# Patient Record
Sex: MALE | Race: BLACK OR AFRICAN AMERICAN | Employment: STUDENT | URBAN - METROPOLITAN AREA
[De-identification: names, ages, dates, MRNs, and addresses within clinical notes are randomized per-mention and may not be internally consistent; named-entity substitution may affect disease eponyms.]

---

## 2017-05-26 ENCOUNTER — HOSPITAL ENCOUNTER (OUTPATIENT)
Dept: PHYSICAL THERAPY | Age: 22
Discharge: HOME | End: 2017-05-26
Payer: COMMERCIAL

## 2017-05-26 PROCEDURE — 97535 SELF CARE MNGMENT TRAINING: CPT

## 2017-05-26 PROCEDURE — 97162 PT EVAL MOD COMPLEX 30 MIN: CPT

## 2017-05-26 PROCEDURE — 97140 MANUAL THERAPY 1/> REGIONS: CPT

## 2017-05-26 NOTE — PROGRESS NOTES
[facility] PHYSICAL THERAPY  [address] [place] [address] 201,[facility], 70 [facility] - Phone: [phone]  Fax: [phone] OF CARE / [address]  Patient Name: Lillie Knowles : 1995   Medical   Diagnosis: Right knee pain [M25.561] Treatment Diagnosis: Right knee pain [M25.561]   Onset Date: 16     Referral Source: Frances Mclean Start of Care [facility]): 2017   Prior Hospitalization: See medical history Provider #: [id number]   Prior Level of Function: Pain free with all activities   Comorbidities: Asthma   Medications: Verified on Patient Summary List   The Plan of Care and following information is based on the information from the initial evaluation.   ===========================================================================================  Assessment / amrte information:  Lillie Knowles is a 22 y.o.  yo male with Dx of Right knee pain [M25.561]. He reports having R ACL reconstruction on 16. He currently rates his pain as 9/10 at worst, 1/10 at best, primarily located at anterior aspect of his R knee. Objective Findings:  Gait: lack of terminal ext noted on R, Knee AROM: R = 7-127 deg, L = 0-125 deg. Manual Muscle Testing:  Quad Set: R = Poor, L = WNL. All other LE strength are WNL. Palpation:  Increased soft tissue tension noted at R biceps femoris and proximal gastrocnemius. Pt instructed in HEP and will f/u in clinic for PT.  ===========================================================================================  Eval Complexity: History MEDIUM  Complexity : 1-2 comorbidities / personal factors will impact the outcome/ POC ;  Examination  MEDIUM Complexity : 3 Standardized tests and measures addressing body structure, function, activity limitation and / or participation in recreation ; Presentation MEDIUM Complexity : Evolving with changing characteristics ;   Decision Making MEDIUM Complexity : FOTO score of 26-74; Overall Complexity MEDIUM  Problem List: pain affecting function, decrease ROM, decrease strength, decrease ADL/ functional abilitiies, decrease activity tolerance and decrease flexibility/ joint mobility   Treatment Plan may include any combination of the following: Therapeutic exercise, Therapeutic activities, Neuromuscular re-education, Physical agent/modality, Manual therapy, Patient education, Self Care training, Functional mobility training and Home safety training  Patient / Family readiness to learn indicated by: asking questions, trying to perform skills and interest  Persons(s) to be included in education: patient (P)  Barriers to Learning/Limitations: no  Measures taken: FOTO = 58%   Patient Goal (s): Decrease pain    Patient self reported health status: good  Rehabilitation Potential: good   Short Term Goals: To be accomplished in  1-2  weeks:  1. Independent with HEP. 2. Decrease max pain 25-50% to assist with return to sporting activities    Long Term Goals: To be accomplished in  3-4  weeks:  1. Decrease max pain 50-75% to assist with return to sporting activities  2. Increase FOTO score to 74% to show functional improvment. 3.  Will rate  >/= +5 on Global Rating of Change and be prepared to DC to HEP. Frequency / Duration:   Patient to be seen  2-3  times per week for 3-4  weeks:  Patient / Caregiver education and instruction: self care and exercises    Therapist Signature: Dayana English DPT, OCS, SCS, CSCS Date: 6/60/9765   Certification Period: na Time: 2:39 PM   ===========================================================================================  I certify that the above Physical Therapy Services are being furnished while the patient is under my care. I agree with the treatment plan and certify that this therapy is necessary.     Physician Signature:        Date:       Time:     Please sign and return to [facility] or you may fax the signed copy to [phone]. Thank you.

## 2017-05-26 NOTE — PROGRESS NOTES
PHYSICAL THERAPY - DAILY TREATMENT NOTE    Patient Name: Darin Jennings        Date: 2017  : 1995   YES Patient  Verified  Visit #:      of   12  Insurance: Payor: /      In time: 2:20 Out time: 4:00   Total Treatment Time: 40     Medicare Time Tracking (below)   Total Timed Codes (min):  na 1:1 Treatment Time:  na     TREATMENT AREA =  Right knee pain [M25.561]    SUBJECTIVE  Pain Level (on 0 to 10 scale):    Medication Changes/New allergies or changes in medical history, any new surgeries or procedures? NO    If yes, update Summary List   Subjective Functional Status/Changes:  []  No changes reported     SEE IE          OBJECTIVE      10 min Manual Therapy: DTM R BF, Med Prox Gastroc, Popliteus   Rationale:      decrease pain, increase ROM and increase tissue extensibility to improve patient's ability to amb     min Patient Education:  YES  Reviewed HEP   []  Progressed/Changed HEP based on: Other Objective/Functional Measures:    SEE IE     Post Treatment Pain Level (on 0 to 10) scale:       ASSESSMENT  Assessment/Changes in Function:     SEE IE     []  See Progress Note/Recertification   Patient will continue to benefit from skilled PT services to modify and progress therapeutic interventions, address functional mobility deficits, address ROM deficits, address strength deficits, analyze and address soft tissue restrictions, analyze and cue movement patterns, analyze and modify body mechanics/ergonomics and assess and modify postural abnormalities to attain remaining goals.    Progress toward goals / Updated goals:         PLAN  []  Upgrade activities as tolerated YES Continue plan of care   []  Discharge due to :    []  Other:      Therapist: Joselin Cyr, PT, OCS, SCS, CSCS    Date: 2017 Time: 1:57 PM       Future Appointments  Date Time Provider Brisa Saxena   2017 2:00 PM [facility]

## 2017-05-30 ENCOUNTER — HOSPITAL ENCOUNTER (OUTPATIENT)
Dept: PHYSICAL THERAPY | Age: 22
Discharge: HOME | End: 2017-05-30
Payer: COMMERCIAL

## 2017-05-30 PROCEDURE — 97140 MANUAL THERAPY 1/> REGIONS: CPT

## 2017-05-30 PROCEDURE — 97110 THERAPEUTIC EXERCISES: CPT

## 2017-05-30 NOTE — PROGRESS NOTES
PHYSICAL THERAPY - DAILY TREATMENT NOTE    Patient Name: Jonelle Anaya        Date: 2017  : 1995   YES Patient  Verified  Visit #:   2   of   12  Insurance: Payor: Jerica Milks / Plan: Greg Fruits PPO / Product Type: PPO /      In time: 233 Out time: 355   Total Treatment Time: 82     Medicare Time Tracking (below)   Total Timed Codes (min):  na 1:1 Treatment Time:  na     TREATMENT AREA =  Right knee pain [M25.561]    SUBJECTIVE  Pain Level (on 0 to 10 scale):  2  / 10   Medication Changes/New allergies or changes in medical history, any new surgeries or procedures? NO    If yes, update Summary List   Subjective Functional Status/Changes:  []  No changes reported     Patient reports the exercises he received at his IE seem to be helping. Patient states he continues to work on improving his knee extension. OBJECTIVE  Modalities Rationale:     decrease inflammation and decrease pain to improve patient's ability to perform transfers. min [] Estim, type/location:                                      []  att     []  unatt     []  w/US     []  w/ice    []  w/heat    min []  Mechanical Traction: type/lbs                   []  pro   []  sup   []  int   []  cont    []  before manual    []  after manual    min []  Ultrasound, settings/location:      min []  Iontophoresis w/ dexamethasone, location:                                               []  take home patch       []  in clinic   10 min [x]  Ice     []  Heat    location/position: To R knee in supine with bolster under ankle    min []  Vasopneumatic Device, press/temp:     min []  Other:    [x] Skin assessment post-treatment (if applicable):    [x]  intact    []  redness- no adverse reaction     []redness  adverse reaction:        57 min Therapeutic Exercise:  [x]  See flow sheet   Rationale:      increase ROM, increase strength, improve coordination and improve balance to improve the patients ability to perform athletic activities. 15 min Manual Therapy: A. R.T to R biceps femoris, R semitendinosis, semimembrinosis; STM to proximal medial and lateral gastroc heads; R HS stretch   Rationale:      decrease pain, increase ROM, increase tissue extensibility and decrease trigger points to improve patient's ability to perform walking activities. min Patient Education:  YES  Reviewed HEP   []  Progressed/Changed HEP based on: Other Objective/Functional Measures:    Patient ambulated with flexed knee gait pattern and poor heel strike throughout session. Progressed program for improved  R knee ROM, flexibility, strength and stability  Tenderness reported to R biceps femoris during MT     Post Treatment Pain Level (on 0 to 10) scale:   1  / 10     ASSESSMENT  Assessment/Changes in Function:     Patient reported improved symptoms post session. Educated on and provided copy of prone hang exercise to perform at home in order to improve R knee extension ROM. []  See Progress Note/Recertification   Patient will continue to benefit from skilled PT services to modify and progress therapeutic interventions, address functional mobility deficits, address ROM deficits, address strength deficits, analyze and address soft tissue restrictions, analyze and cue movement patterns, analyze and modify body mechanics/ergonomics, assess and modify postural abnormalities and address imbalance/dizziness to attain remaining goals.    Progress toward goals / Updated goals:    Progressing well with STG#1     PLAN  [x]  Upgrade activities as tolerated YES Continue plan of care   []  Discharge due to :    []  Other:      Therapist: Trinda Klinefelter, PT    Date: 5/30/2017 Time: 4:47 PM     Future Appointments  Date Time Provider Brisa Saxena   6/1/2017 2:30 PM Trinda Klinefelter, PT [facility]

## 2017-06-01 ENCOUNTER — HOSPITAL ENCOUNTER (OUTPATIENT)
Dept: PHYSICAL THERAPY | Age: 22
Discharge: HOME | End: 2017-06-01
Payer: COMMERCIAL

## 2017-06-01 PROCEDURE — 97140 MANUAL THERAPY 1/> REGIONS: CPT

## 2017-06-01 PROCEDURE — 97110 THERAPEUTIC EXERCISES: CPT

## 2017-06-01 NOTE — PROGRESS NOTES
PHYSICAL THERAPY - DAILY TREATMENT NOTE    Patient Name: Elvis Concepcion        Date: 2017  : 1995   YES Patient  Verified  Visit #:   3   of   12  Insurance: Payor: Tip Stratton / Plan: Matilde Arciniega PPO / Product Type: PPO /      In time: 235 Out time: 330   Total Treatment Time: 55     Medicare Time Tracking (below)   Total Timed Codes (min):  na 1:1 Treatment Time:  na     TREATMENT AREA =  Right knee pain [M25.561]    SUBJECTIVE  Pain Level (on 0 to 10 scale):  2  / 10   Medication Changes/New allergies or changes in medical history, any new surgeries or procedures? NO    If yes, update Summary List   Subjective Functional Status/Changes:  []  No changes reported     Patient reports his knee felt good after his LV. Patient denies complications since his LV. OBJECTIVE  Modalities Rationale:     decrease inflammation and decrease pain to improve patient's ability to perform transfers. min [] Estim, type/location:                                      []  att     []  unatt     []  w/US     []  w/ice    []  w/heat    min []  Mechanical Traction: type/lbs                   []  pro   []  sup   []  int   []  cont    []  before manual    []  after manual    min []  Ultrasound, settings/location:      min []  Iontophoresis w/ dexamethasone, location:                                               []  take home patch       []  in clinic   10 min [x]  Ice     []  Heat    location/position: To R knee in supine    min []  Vasopneumatic Device, press/temp:     min []  Other:    [x] Skin assessment post-treatment (if applicable):    [x]  intact    []  redness- no adverse reaction     []redness  adverse reaction:         35 min Therapeutic Exercise:  [x]  See flow sheet   Rationale:      increase ROM, increase strength, improve coordination and improve balance to improve the patients ability to perform athletic activities. 10 min Manual Therapy: A. R.T to R Biceps femoris, R proximal gastroc; R posterior femur mobs, R HS stretch   Rationale:      decrease pain, increase ROM, increase tissue extensibility and decrease trigger points to improve patient's ability to perform walking activities. min Patient Education:  YES  Reviewed HEP   []  Progressed/Changed HEP based on: Other Objective/Functional Measures:    Patient able to achieve passive knee extension to near neutral position, however continues to ambulate on a flexed knee with poor terminal knee extension. Patient able to complete program with increased fatigue, however no increase in pain. Post Treatment Pain Level (on 0 to 10) scale:   2  / 10     ASSESSMENT  Assessment/Changes in Function:     Patient denied changes in symptoms post session. Will continue to benefit from progression of program in order to establish full ROM and promote return to athletic activities. []  See Progress Note/Recertification   Patient will continue to benefit from skilled PT services to modify and progress therapeutic interventions, address functional mobility deficits, address ROM deficits, address strength deficits, analyze and address soft tissue restrictions, analyze and cue movement patterns, analyze and modify body mechanics/ergonomics and assess and modify postural abnormalities to attain remaining goals.    Progress toward goals / Updated goals:    Met STG#1     PLAN  [x]  Upgrade activities as tolerated YES Continue plan of care   []  Discharge due to :    []  Other:      Therapist: Triston Spann PT    Date: 6/1/2017 Time: 5:06 PM     Future Appointments  Date Time Provider Brisa Saxena   6/6/2017 2:30 PM Triston Spann PT [facility]   6/13/2017 2:00 PM Dougie Jarvis, PT [facility]   6/15/2017 8:00 AM Dougie Jarvis PT [facility]

## 2017-06-06 ENCOUNTER — HOSPITAL ENCOUNTER (OUTPATIENT)
Dept: PHYSICAL THERAPY | Age: 22
Discharge: HOME | End: 2017-06-06
Payer: COMMERCIAL

## 2017-06-06 PROCEDURE — 97140 MANUAL THERAPY 1/> REGIONS: CPT

## 2017-06-06 PROCEDURE — 97110 THERAPEUTIC EXERCISES: CPT

## 2017-06-06 NOTE — PROGRESS NOTES
PHYSICAL THERAPY - DAILY TREATMENT NOTE    Patient Name: Jonelle Anaya        Date: 2017  : 1995   YES Patient  Verified  Visit #:      of   12  Insurance: Payor: Jerica Milks / Plan: Greg Fruits PPO / Product Type: PPO /      In time: 230 Out time: 338   Total Treatment Time: 68     Medicare Time Tracking (below)   Total Timed Codes (min):  na 1:1 Treatment Time:  na     TREATMENT AREA =  Right knee pain [M25.561]    SUBJECTIVE  Pain Level (on 0 to 10 scale):  1  / 10   Medication Changes/New allergies or changes in medical history, any new surgeries or procedures? NO    If yes, update Summary List   Subjective Functional Status/Changes:  []  No changes reported     Patient states his knee doesn't hurt, it just feels stiff. Patient reports he continues to perform his exercise at home which seems to be helping. OBJECTIVE    53 min Therapeutic Exercise:  [x]  See flow sheet   Rationale:      increase ROM, increase strength, improve coordination, improve balance and increase proprioception to improve the patients ability to perform athletic activities. 15 min Manual Therapy: A. R.T to R medial and lateral HS, STM to proximal gastrocs; R HS stretcg   Rationale:      decrease pain, increase ROM, increase tissue extensibility and decrease trigger points to improve patient's ability to perform walking activities. min Patient Education:  YES  Reviewed HEP   []  Progressed/Changed HEP based on: Other Objective/Functional Measures:    Progressed several TE today for improved R LE strength, stability and proprioception  Patient cued during SL squat to table on proper weight shift and alignment of R LE-patient reporting increased difficulty and fatigue after performing correctly. Post Treatment Pain Level (on 0 to 10) scale:   0  / 10     ASSESSMENT  Assessment/Changes in Function:     Patient tolerated progression of her program well today.  Patient educated to perform SL squat to high chair or couch at home with focus on proper knee alignment and stability. []  See Progress Note/Recertification   Patient will continue to benefit from skilled PT services to modify and progress therapeutic interventions, address functional mobility deficits, address ROM deficits, address strength deficits, analyze and address soft tissue restrictions, analyze and cue movement patterns, analyze and modify body mechanics/ergonomics, assess and modify postural abnormalities and address imbalance/dizziness to attain remaining goals.    Progress toward goals / Updated goals:    Progressing well with LTG#1     PLAN  [x]  Upgrade activities as tolerated YES Continue plan of care   []  Discharge due to :    []  Other:      Therapist: Toma Key PT    Date: 6/6/2017 Time: 5:58 PM     Future Appointments  Date Time Provider Brisa Saxena   6/13/2017 2:00 PM Adolfo Canales PT [facility]   6/15/2017 8:00 AM Adolfo Canales, PT [facility]

## 2017-06-13 ENCOUNTER — HOSPITAL ENCOUNTER (OUTPATIENT)
Dept: PHYSICAL THERAPY | Age: 22
Discharge: HOME | End: 2017-06-13
Payer: COMMERCIAL

## 2017-06-13 PROCEDURE — 97140 MANUAL THERAPY 1/> REGIONS: CPT

## 2017-06-13 PROCEDURE — 97110 THERAPEUTIC EXERCISES: CPT

## 2017-06-13 NOTE — PROGRESS NOTES
PHYSICAL THERAPY - DAILY TREATMENT NOTE    Patient Name: Ferny Amin        Date: 2017  : 1995   YES Patient  Verified  Visit #:      of   12  Insurance: Payor: Jerald Silverman / Plan: Vern Ross PPO / Product Type: PPO /      In time: 2:00 Out time: 3:00   Total Treatment Time: 60     Medicare Time Tracking (below)   Total Timed Codes (min):  na 1:1 Treatment Time:  na     TREATMENT AREA =  Right knee pain [M25.561]    SUBJECTIVE  Pain Level (on 0 to 10 scale):  3- 10   Medication Changes/New allergies or changes in medical history, any new surgeries or procedures? NO    If yes, update Summary List   Subjective Functional Status/Changes:  []  No changes reported     Knee is getting straighter, but still not all the way          OBJECTIVE      45 min Therapeutic Exercise:  [x]  See flow sheet   Rationale:      increase ROM, increase strength and improve coordination to improve the patients ability to return to sport     15 min Manual Therapy: DTM R BF, Med Prox Gastroc, Popliteus, HS stretch   Rationale: decrease pain, increase ROM and increase tissue extensibility to improve patient's ability to return to sport     min Patient Education:  YES  Reviewed HEP   []  Progressed/Changed HEP based on: Other Objective/Functional Measures:    Cont to lack end range ext and demonstrates instability with step downs.       Post Treatment Pain Level (on 0 to 10) scale:   1  / 10     ASSESSMENT  Assessment/Changes in Function:     Good marimar to all Rx without increase in pain      []  See Progress Note/Recertification   Patient will continue to benefit from skilled PT services to modify and progress therapeutic interventions, address functional mobility deficits, address ROM deficits, address strength deficits, analyze and address soft tissue restrictions, analyze and cue movement patterns, analyze and modify body mechanics/ergonomics and assess and modify postural abnormalities to attain remaining goals.   Progress toward goals / Updated goals:    Slow progress with ROM      PLAN  []  Upgrade activities as tolerated YES Continue plan of care   []  Discharge due to :    []  Other:      Therapist: Graham De La Cruz, PT, OCS, SCS, CSCS    Date: 6/13/2017 Time: 10:04 AM       Future Appointments  Date Time Provider Brisa Saxena   6/13/2017 2:00 PM Abdi Tucker PT [facility]   6/15/2017 8:00 AM Abdi Tucker PT [address] [place] [address]

## 2017-06-16 ENCOUNTER — HOSPITAL ENCOUNTER (OUTPATIENT)
Dept: PHYSICAL THERAPY | Age: 22
Discharge: HOME | End: 2017-06-16
Payer: COMMERCIAL

## 2017-06-16 PROCEDURE — 97110 THERAPEUTIC EXERCISES: CPT

## 2017-06-16 PROCEDURE — 97140 MANUAL THERAPY 1/> REGIONS: CPT

## 2017-06-16 NOTE — PROGRESS NOTES
PHYSICAL THERAPY - DAILY TREATMENT NOTE    Patient Name: Sharon Dykes        Date: 2017  : 1995   YES Patient  Verified  Visit #:     Insurance: Payor: [id number] PIYUSH Turner / Plan: Kailey Anglin PPO / Product Type: PPO /      In time: 12:55 Out time: 1:55   Total Treatment Time: 60     Medicare Time Tracking (below)   Total Timed Codes (min):  na 1:1 Treatment Time:  na     TREATMENT AREA =  Right knee pain [M25.561]    SUBJECTIVE  Pain Level (on 0 to 10 scale):  2-3   10   Medication Changes/New allergies or changes in medical history, any new surgeries or procedures? NO    If yes, update Summary List   Subjective Functional Status/Changes:  []  No changes reported     Im sore because I lifted weights, but it was good          OBJECTIVE    45 min Therapeutic Exercise: [x] See flow sheet   Rationale: increase ROM, increase strength and improve coordination to improve the patients ability to return to sport      15 min Manual Therapy: DTM R BF, Med Prox Gastroc, Popliteus, HS stretch   Rationale: decrease pain, increase ROM and increase tissue extensibility to improve patient's ability to return to sport     min Patient Education:  YES  Reviewed HEP   []  Progressed/Changed HEP based on: Other Objective/Functional Measures:    Cont to have difficulty with side planks  FOTO = 68  GROC = +5   Post Treatment Pain Level (on 0 to 10) scale:    10     ASSESSMENT  Assessment/Changes in Function:     Good marimar to all Rx without increase in pain      []  See Progress Note/Recertification   Patient will continue to benefit from skilled PT services to modify and progress therapeutic interventions, address functional mobility deficits, address ROM deficits, address strength deficits, analyze and address soft tissue restrictions, analyze and cue movement patterns, analyze and modify body mechanics/ergonomics and assess and modify postural abnormalities to attain remaining goals.    Progress toward goals / Updated goals:    Slow progress with ROM      PLAN  []  Upgrade activities as tolerated YES Continue plan of care   []  Discharge due to :    []  Other:      Therapist: Delmy Jarvis, PT, OCS, SCS, CSCS    Date: 6/16/2017 Time: 12:48 PM       Future Appointments  Date Time Provider Brisa Saxena   6/16/2017 1:00 PM Jayden Drake PT [facility]   6/20/2017 10:30 AM Jayden Drake PT [facility]   6/23/2017 2:30 PM Jayden Drake PT [facility]   6/27/2017 3:00 PM Precilla Severin, [facility]

## 2017-06-20 ENCOUNTER — HOSPITAL ENCOUNTER (OUTPATIENT)
Dept: PHYSICAL THERAPY | Age: 22
Discharge: HOME | End: 2017-06-20
Payer: COMMERCIAL

## 2017-06-20 PROCEDURE — 97140 MANUAL THERAPY 1/> REGIONS: CPT

## 2017-06-20 PROCEDURE — 97110 THERAPEUTIC EXERCISES: CPT

## 2017-06-20 NOTE — PROGRESS NOTES
PHYSICAL THERAPY - DAILY TREATMENT NOTE    Patient Name: Peggy Koenig        Date: 2017  : 1995   YES Patient  Verified  Visit #:     Insurance: Payor: Marcelina Dodson / Plan: Christie Pinzon PPO / Product Type: PPO /      In time: 10:30 Out time: 11:20   Total Treatment Time: 50     Medicare Time Tracking (below)   Total Timed Codes (min):  na 1:1 Treatment Time:  na     TREATMENT AREA =  Right knee pain [M25.561]    SUBJECTIVE  Pain Level (on 0 to 10 scale):  1  / 10   Medication Changes/New allergies or changes in medical history, any new surgeries or procedures? NO    If yes, update Summary List   Subjective Functional Status/Changes:  []  No changes reported     No new c/o          OBJECTIVE    40 min Therapeutic Exercise: [x] See flow sheet   Rationale: increase ROM, increase strength and improve coordination to improve the patients ability to return to sport       10 min Manual Therapy: DTM R BF, Med Prox Gastroc, Popliteus, HS stretch   Rationale: decrease pain, increase ROM and increase tissue extensibility to improve patient's ability to return to sport     min Patient Education:  YES  Reviewed HEP   []  Progressed/Changed HEP based on: Other Objective/Functional Measures:    Cont to lack end range ext     Post Treatment Pain Level (on 0 to 10) scale:   0  / 10     ASSESSMENT  Assessment/Changes in Function:     Good marimar to al Rx without increase in pain      []  See Progress Note/Recertification   Patient will continue to benefit from skilled PT services to modify and progress therapeutic interventions, address functional mobility deficits, address ROM deficits, address strength deficits, analyze and address soft tissue restrictions, analyze and cue movement patterns, analyze and modify body mechanics/ergonomics and assess and modify postural abnormalities to attain remaining goals.    Progress toward goals / Updated goals:    Slow progress with ROM      PLAN  []  Upgrade activities as tolerated YES Continue plan of care   []  Discharge due to :    []  Other:      Therapist: Angelo Flores, PT, OCS, SCS, CSCS    Date: 6/20/2017 Time: 9:55 AM       Future Appointments  Date Time Provider Brisa Saxena   6/20/2017 10:30 AM Cuong Arguelles, IRVIN [facility]   6/23/2017 2:30 PM Cuong Arguelles PT [facility]   6/27/2017 3:00 PM Evans Reilly PT [facility]

## 2017-06-23 ENCOUNTER — HOSPITAL ENCOUNTER (OUTPATIENT)
Dept: PHYSICAL THERAPY | Age: 22
Discharge: HOME | End: 2017-06-23
Payer: COMMERCIAL

## 2017-06-23 PROCEDURE — 97140 MANUAL THERAPY 1/> REGIONS: CPT

## 2017-06-23 PROCEDURE — 97110 THERAPEUTIC EXERCISES: CPT

## 2017-06-23 NOTE — PROGRESS NOTES
[address] PHYSICAL THERAPY   [facility] [address], [address],[facility], [facility] - Phone: [phone]  Fax: [phone]  PROGRESS NOTE  Patient Name: Toni Sykes : 1995   Treatment/Medical Diagnosis: Right knee pain [M25.561]   Referral Source: Miladis Bishop, *     Date of Initial Visit: 17 Attended Visits: 8 Missed Visits: 0     SUMMARY OF TREATMENT  Toni Sykes has been seen at our clinic 2-3x/wk for a total of 8 visits. Pt treatment has consisted of  therapeutic exercise for knee ROM, LE strengthening, hip/core strengthening, and manual therapy (patellar mobilization and deep tissue mobilization at hamstring and gastroc)  CURRENT STATUS  Pt has had a good tolerance to physical therapy treatment. His knee ROM has improved to 7 - 125 (was 7-127 at IE). He also demonstrates improved stability and is now able to perform sing leg step down exercise on 6 inch box with minimal instability. However, he continues to demonstrate lack of lateral stability with therapeutic exercises and has not been able to progress to single leg plyometric type of exercises. Goal/Measure of Progress Goal Met? 1. Decrease Max pain by 50-75% to assist with return to sport   Status at last Eval: 9/10 Current Status: 3/10 yes   2. Increase FOTO score to 74 % show functional improvement   Status at last Eval: 58% Current Status: 68% progressing   3. Will rate >/= +5 on Global Rating of Change and be prepared to DC to HEP. Status at last Eval: na Current Status: +5 yes     New Goals to be achieved in __4__  weeks:  1. Pt will begin SL plyometric exercise in preparation for return to sport   2.     3.     RECOMMENDATIONS    Specifics: 2-3x/wk x 4 more wks  If you have any questions/comments please contact us directly at [phone]. Thank you for allowing us to assist in the care of your patient.     Therapist Signature: Dayana English DPT, OCS, [place], CSCS Date: 6/23/2017     Time: 4:18 PM   NOTE TO PHYSICIAN:  PLEASE COMPLETE THE ORDERS BELOW AND FAX TO   [facility]: [phone]  If you are unable to process this request in 24 hours please contact our office: [phone]    ___ I have read the above report and request that my patient continue as recommended.   ___ I have read the above report and request that my patient continue therapy with the following changes/special instructions:_________________________________________________________   ___ I have read the above report and request that my patient be discharged from therapy.      Physician Signature:        Date:       Time:

## 2017-06-23 NOTE — PROGRESS NOTES
PHYSICAL THERAPY - DAILY TREATMENT NOTE    Patient Name: Heather Tinoco        Date: 2017  : 1995   YES Patient  Verified  Visit #:   8      12  Insurance: Payor: Cassi Martínez / Plan: Adonis De Paz PPO / Product Type: PPO /      In time: 2:20 Out time: 3:30   Total Treatment Time: 70     Medicare Time Tracking (below)   Total Timed Codes (min):  na 1:1 Treatment Time:  na     TREATMENT AREA =  Right knee pain [M25.561]    SUBJECTIVE  Pain Level (on 0 to 10 scale):  3  / 10   Medication Changes/New allergies or changes in medical history, any new surgeries or procedures? NO    If yes, update Summary List   Subjective Functional Status/Changes:  []  No changes reported     Just a little sore. OBJECTIVE    60 min Therapeutic Exercise: [x] See flow sheet   Rationale: increase ROM, increase strength and improve coordination to improve the patients ability to return to sport       10 min Manual Therapy: DTM R BF, Med Prox Gastroc, Popliteus, HS stretch   Rationale: decrease pain, increase ROM and increase tissue extensibility to improve patient's ability to return to sport     min Patient Education:  YES  Reviewed HEP   []  Progressed/Changed HEP based on: Other Objective/Functional Measures:    Cont to have difficulty with side plank. Post Treatment Pain Level (on 0 to 10) scale:   0  / 10     ASSESSMENT  Assessment/Changes in Function:     Good marimar to all Rx without increase in pain      []  See Progress Note/Recertification   Patient will continue to benefit from skilled PT services to modify and progress therapeutic interventions, address functional mobility deficits, address ROM deficits, address strength deficits, analyze and address soft tissue restrictions, analyze and cue movement patterns, analyze and modify body mechanics/ergonomics and assess and modify postural abnormalities to attain remaining goals.    Progress toward goals / Updated goals:    Slowly progressing with stability     PLAN  []  Upgrade activities as tolerated YES Continue plan of care   []  Discharge due to :    []  Other:      Therapist: Natanael Nj, PT, OCS, SCS, CSCS    Date: 6/23/2017 Time: 9:01 AM       Future Appointments  Date Time Provider Brisa Saxena   6/23/2017 2:30 PM Pilar Mcfarlane, PT [facility]   6/27/2017 3:00 PM Radha Conti, PT [facility]

## 2017-06-27 ENCOUNTER — HOSPITAL ENCOUNTER (OUTPATIENT)
Dept: PHYSICAL THERAPY | Age: 22
Discharge: HOME | End: 2017-06-27
Payer: COMMERCIAL

## 2017-06-27 PROCEDURE — 97140 MANUAL THERAPY 1/> REGIONS: CPT

## 2017-06-27 PROCEDURE — 97110 THERAPEUTIC EXERCISES: CPT

## 2017-06-27 NOTE — PROGRESS NOTES
PHYSICAL THERAPY - DAILY TREATMENT NOTE    Patient Name: Josefina Vasquez        Date: 2017  : 1995   YES Patient  Verified  Visit #:     Insurance: Payor: Aure Ibrahim / Plan: Lesly Cruz PPO / Product Type: PPO /      In time: 300 Out time: 420   Total Treatment Time: 80     Medicare Time Tracking (below)   Total Timed Codes (min):  na 1:1 Treatment Time:  na     TREATMENT AREA =  Right knee pain [M25.561]    SUBJECTIVE  Pain Level (on 0 to 10 scale):  2-3  / 10   Medication Changes/New allergies or changes in medical history, any new surgeries or procedures? NO    If yes, update Summary List   Subjective Functional Status/Changes:  []  No changes reported     Patient states he stretched his knee out earlier today but then took a nap so now he feels stiff. Patient reports he has been feeling good since his last appointment. OBJECTIVE  Modalities Rationale:     decrease inflammation and decrease pain to improve patient's ability to perform transfers. min [] Estim, type/location:                                      []  att     []  unatt     []  w/US     []  w/ice    []  w/heat    min []  Mechanical Traction: type/lbs                   []  pro   []  sup   []  int   []  cont    []  before manual    []  after manual    min []  Ultrasound, settings/location:      min []  Iontophoresis w/ dexamethasone, location:                                               []  take home patch       []  in clinic   10 min [x]  Ice     []  Heat    location/position: To R knee in long sit    min []  Vasopneumatic Device, press/temp:     min []  Other:    [x] Skin assessment post-treatment (if applicable):    [x]  intact    []  redness- no adverse reaction     []redness  adverse reaction:        60 min Therapeutic Exercise:  [x]  See flow sheet   Rationale:      increase ROM, increase strength, improve coordination and improve balance to improve the patients ability to perform athletic activities. 10 min Manual Therapy: STM to R BM, R medial gastroc head, R HS stretch   Rationale:      decrease pain, increase ROM, increase tissue extensibility and decrease trigger points to improve patient's ability to perform walking activities. min Patient Education:  YES  Reviewed HEP   []  Progressed/Changed HEP based on: Other Objective/Functional Measures:    R knee AROM 7-125 post MT  Progressed clean pull to 165#  Demo good technique with clean high pull, barbell RDL and box jumps     Post Treatment Pain Level (on 0 to 10) scale:   0  / 10     ASSESSMENT  Assessment/Changes in Function:     Patient reported reduced symptoms post session. Will continue to benefit from PT in order to improve R knee strength and stability and progress toward return to high level football activities. []  See Progress Note/Recertification   Patient will continue to benefit from skilled PT services to modify and progress therapeutic interventions, address functional mobility deficits, address ROM deficits, address strength deficits, analyze and address soft tissue restrictions, analyze and cue movement patterns, analyze and modify body mechanics/ergonomics and assess and modify postural abnormalities to attain remaining goals.    Progress toward goals / Updated goals:    Progressing toward new LTG#1     PLAN  [x]  Upgrade activities as tolerated YES Continue plan of care   []  Discharge due to :    []  Other:      Therapist: Leigh Pan PT    Date: 6/27/2017 Time: 5:59 PM     Future Appointments  Date Time Provider Brisa Saxena   7/6/2017 10:30 AM Hanh Sims PT [facility]   7/11/2017 2:30 PM Hanh Sims PT [facility]   7/13/2017 2:30 PM Leigh Pan PT [facility]

## 2017-07-06 ENCOUNTER — HOSPITAL ENCOUNTER (OUTPATIENT)
Dept: PHYSICAL THERAPY | Age: 22
Discharge: HOME | End: 2017-07-06
Payer: COMMERCIAL

## 2017-07-06 PROCEDURE — 97140 MANUAL THERAPY 1/> REGIONS: CPT

## 2017-07-06 PROCEDURE — 97110 THERAPEUTIC EXERCISES: CPT

## 2017-07-06 NOTE — PROGRESS NOTES
PHYSICAL THERAPY - DAILY TREATMENT NOTE    Patient Name: Rancho Russell        Date: 2017  : 1995   YES Patient  Verified  Visit #:   10   of   12  Insurance: Payor: Jeannie Marquez / Plan: Gary Gutierrez PPO / Product Type: PPO /      In time: 10:40 Out time: 12:05   Total Treatment Time: 80     Medicare Time Tracking (below)   Total Timed Codes (min):  na 1:1 Treatment Time:  na     TREATMENT AREA =  Right knee pain [M25.561]    SUBJECTIVE  Pain Level (on 0 to 10 scale):  2  / 10   Medication Changes/New allergies or changes in medical history, any new surgeries or procedures?     NO    If yes, update Summary List   Subjective Functional Status/Changes:  []  No changes reported     I have been pushing the last few deg of ext          OBJECTIVE    Modalities Rationale:     decrease inflammation and decrease pain to improve patient's ability to perform transfers.                 min [] Estim, type/location:                                       []  att     []  unatt     []  w/US     []  w/ice    []  w/heat     min []  Mechanical Traction: type/lbs                    []  pro   []  sup   []  int   []  cont    []  before manual    []  after manual     min []  Ultrasound, settings/location:        min []  Iontophoresis w/ dexamethasone, location:                                                []  take home patch       []  in clinic   10 min [x]  Ice     []  Heat    location/position: To R knee in long sit     min []  Vasopneumatic Device, press/temp:       min []  Other:     [x] Skin assessment post-treatment (if applicable):    [x]  intact    []  redness- no adverse reaction     []redness  adverse reaction:         65 min Therapeutic Exercise:  [x]  See flow sheet   Rationale:      increase ROM, increase strength, improve coordination and improve balance to improve the patients ability to perform athletic activities.       10 min Manual Therapy: STM to R BM, R medial gastroc head, R HS stretch   Rationale: decrease pain, increase ROM, increase tissue extensibility and decrease trigger points to improve patient's ability to perform walking activities. min Patient Education:  YES  Reviewed HEP   []  Progressed/Changed HEP based on: Other Objective/Functional Measures:    Cont to demonstrate some instability with side plank     Post Treatment Pain Level (on 0 to 10) scale:   0  / 10     ASSESSMENT  Assessment/Changes in Function:     Good marimar to all Rx without increase in pain      []  See Progress Note/Recertification   Patient will continue to benefit from skilled PT services to modify and progress therapeutic interventions, address functional mobility deficits, address ROM deficits, address strength deficits, analyze and address soft tissue restrictions, analyze and cue movement patterns, analyze and modify body mechanics/ergonomics and assess and modify postural abnormalities to attain remaining goals.    Progress toward goals / Updated goals:    Progressing well with strength     PLAN  []  Upgrade activities as tolerated YES Continue plan of care   []  Discharge due to :    []  Other:      Therapist: Marika Streeter, PT, OCS, SCS, CSCS    Date: 7/6/2017 Time: 7:21 AM       Future Appointments  Date Time Provider Brisa Saxena   7/6/2017 10:30 AM Nacho Pradhan PT [facility]   7/11/2017 2:30 PM Nacho Pradhan PT [facility]   7/13/2017 2:30 PM Stephanie Allred PT [facility]

## 2017-07-11 ENCOUNTER — APPOINTMENT (OUTPATIENT)
Dept: PHYSICAL THERAPY | Age: 22
End: 2017-07-11
Payer: COMMERCIAL

## 2017-07-13 ENCOUNTER — HOSPITAL ENCOUNTER (OUTPATIENT)
Dept: PHYSICAL THERAPY | Age: 22
Discharge: HOME | End: 2017-07-13
Payer: COMMERCIAL

## 2017-07-13 PROCEDURE — 97140 MANUAL THERAPY 1/> REGIONS: CPT

## 2017-07-13 PROCEDURE — 97110 THERAPEUTIC EXERCISES: CPT

## 2017-07-13 NOTE — PROGRESS NOTES
PHYSICAL THERAPY - DAILY TREATMENT NOTE    Patient Name: Bladimir Groves        Date: 2017  : 1995   YES Patient  Verified  Visit #:     Insurance: Payor: Lyndsey Jamisons / Plan: Alexandria Nation PPO / Product Type: PPO /      In time: 234 Out time: 356   Total Treatment Time: 72     Medicare Time Tracking (below)   Total Timed Codes (min):  na 1:1 Treatment Time:  na     TREATMENT AREA =  Right knee pain [M25.561]    SUBJECTIVE  Pain Level (on 0 to 10 scale):  2-3  / 10   Medication Changes/New allergies or changes in medical history, any new surgeries or procedures? NO    If yes, update Summary List   Subjective Functional Status/Changes:  []  No changes reported     Patient reports his knee feels stiff today but is unsure why. Patient continues to workout his knee independently when not in PT without complication. Patient also feels he is slowly gaining more confidence in his knee. OBJECTIVE  Modalities Rationale:     decrease inflammation and decrease pain to improve patient's ability to perform recreational activities.     min [] Estim, type/location:                                      []  att     []  unatt     []  w/US     []  w/ice    []  w/heat    min []  Mechanical Traction: type/lbs                   []  pro   []  sup   []  int   []  cont    []  before manual    []  after manual    min []  Ultrasound, settings/location:      min []  Iontophoresis w/ dexamethasone, location:                                               []  take home patch       []  in clinic   10 min [x]  Ice     []  Heat    location/position: To B knees in supine with bolster    min []  Vasopneumatic Device, press/temp:     min []  Other:    [x] Skin assessment post-treatment (if applicable):    [x]  intact    []  redness- no adverse reaction     []redness  adverse reaction:         52 min Therapeutic Exercise:  [x]  See flow sheet   Rationale:      increase ROM, increase strength, improve coordination and improve balance to improve the patients ability to perform athletic activities. 10 min Manual Therapy: STM to R medial gastroc, R medial HS; R HS stretch   Rationale:      decrease pain, increase ROM, increase tissue extensibility and decrease trigger points to improve patient's ability to perform walking activities. min Patient Education:  YES  Reviewed HEP   []  Progressed/Changed HEP based on: Other Objective/Functional Measures:    Performed RDL and SL RDL during today's session, held on trap bar deadlift-plan to perform at [place]  Demonstrated good technique with SL squats after cuing on proper knee alignment     Post Treatment Pain Level (on 0 to 10) scale:   0  / 10     ASSESSMENT  Assessment/Changes in Function:     Patient reported improved symptoms post session. Patient tolerating higher level exercise well and will continue to benefit from progression of program in order to return to football activities. []  See Progress Note/Recertification   Patient will continue to benefit from skilled PT services to modify and progress therapeutic interventions, address functional mobility deficits, address ROM deficits, address strength deficits, analyze and address soft tissue restrictions, analyze and cue movement patterns, analyze and modify body mechanics/ergonomics and assess and modify postural abnormalities to attain remaining goals.    Progress toward goals / Updated goals:    Good progress with new LTG#1     PLAN  [x]  Upgrade activities as tolerated YES Continue plan of care   []  Discharge due to :    []  Other:      Therapist: Anay Perez PT    Date: 7/13/2017 Time: 5:09 PM     Future Appointments  Date Time Provider Brisa Saxena   7/14/2017 7:30 AM Kyle Shah, PT [facility]   7/18/2017 3:00 PM Anay Perez, PT [facility]   7/20/2017 3:00 PM Anay Perez, PT [facility]

## 2017-07-14 ENCOUNTER — HOSPITAL ENCOUNTER (OUTPATIENT)
Dept: PHYSICAL THERAPY | Age: 22
End: 2017-07-14
Payer: COMMERCIAL

## 2017-07-18 ENCOUNTER — HOSPITAL ENCOUNTER (OUTPATIENT)
Dept: PHYSICAL THERAPY | Age: 22
Discharge: HOME | End: 2017-07-18
Payer: COMMERCIAL

## 2017-07-18 PROCEDURE — 97530 THERAPEUTIC ACTIVITIES: CPT

## 2017-07-18 PROCEDURE — 97110 THERAPEUTIC EXERCISES: CPT

## 2017-07-18 NOTE — PROGRESS NOTES
PHYSICAL THERAPY - DAILY TREATMENT NOTE    Patient Name: Sary Pena        Date: 2017  : 1995   YES Patient  Verified  Visit #:   12   of   12(+6)  Insurance: Payor: Kahlil Villalobos / Plan: Pino Soto PPO / Product Type: PPO /      In time: 245 Out time: 404   Total Treatment Time: 79     Medicare Time Tracking (below)   Total Timed Codes (min):  na 1:1 Treatment Time:  na     TREATMENT AREA =  Right knee pain [M25.561]    SUBJECTIVE  Pain Level (on 0 to 10 scale):    / 10   Medication Changes/New allergies or changes in medical history, any new surgeries or procedures? NO    If yes, update Summary List   Subjective Functional Status/Changes:  []  No changes reported     Patient reports his knee continues to feel stiff, particularly in the AM, and then feels he needs to stretch in order to move better. Patient states the stiffness and lack of extension in his knee always comes back, however. OBJECTIVE  Modalities Rationale:     decrease inflammation and decrease pain to improve patient's ability to perform walking activities.     min [] Estim, type/location:                                      []  att     []  unatt     []  w/US     []  w/ice    []  w/heat    min []  Mechanical Traction: type/lbs                   []  pro   []  sup   []  int   []  cont    []  before manual    []  after manual    min []  Ultrasound, settings/location:      min []  Iontophoresis w/ dexamethasone, location:                                               []  take home patch       []  in clinic   10 min [x]  Ice     []  Heat    location/position: To R knee in long sit with ankle elevated to promoteTKE    min []  Vasopneumatic Device, press/temp:     min []  Other:    [x] Skin assessment post-treatment (if applicable):    [x]  intact    []  redness- no adverse reaction     []redness  adverse reaction:         49 min Therapeutic Exercise:  [x]  See flow sheet   Rationale:      increase ROM, increase strength, improve coordination and improve balance to improve the patients ability to perform recreational activities. 20 min Therapeutic Activity: Sled push 165lbs 3x5 yard, 3x10 yards; pass rush drill 5 reps per side; 3 point stance to stand drill 5 reps   Rationale:    increase strength, improve coordination, improve balance and increase proprioception to improve the patients ability to perform football activities. min Patient Education:  YES  Reviewed HEP   []  Progressed/Changed HEP based on: Other Objective/Functional Measures: Added football type activities during today's session in order to promote return to full football activities  Patient frequently reporting lack of adequate \"push off\" when attempting to explode out of 3 point stance. Demonstrates good technique and tolerance to all strength training exercises, would benefit from progression of weights in this area to promote further improvements. Post Treatment Pain Level (on 0 to 10) scale:   1  / 10     ASSESSMENT  Assessment/Changes in Function:     Patient reported feeling \"better and more loose\" at the end of his session. Educated on performing 3 point stance to stand drill at home in order to improve upon push off of R LE and promote improved confidence in his knee. Patient acknowledged understanding. []  See Progress Note/Recertification   Patient will continue to benefit from skilled PT services to modify and progress therapeutic interventions, address functional mobility deficits, address ROM deficits, address strength deficits, analyze and address soft tissue restrictions, analyze and cue movement patterns, analyze and modify body mechanics/ergonomics, assess and modify postural abnormalities and address imbalance/dizziness to attain remaining goals. Progress toward goals / Updated goals:    Progressing well with LTG#1 per performance in PT.       PLAN  [x]  Upgrade activities as tolerated YES Continue plan of care []  Discharge due to :    []  Other:      Therapist: Melecio Baez PT    Date: 7/18/2017 Time: 5:16 PM     Future Appointments  Date Time Provider Brisa Saxena   7/20/2017 3:00 PM Melecio Baez PT [facility]

## 2017-07-20 ENCOUNTER — HOSPITAL ENCOUNTER (OUTPATIENT)
Dept: PHYSICAL THERAPY | Age: 22
Discharge: HOME | End: 2017-07-20
Payer: COMMERCIAL

## 2017-07-20 PROCEDURE — 97110 THERAPEUTIC EXERCISES: CPT

## 2017-07-20 PROCEDURE — 97530 THERAPEUTIC ACTIVITIES: CPT

## 2017-07-20 NOTE — PROGRESS NOTES
PHYSICAL THERAPY - DAILY TREATMENT NOTE    Patient Name: Daniele Calhoun        Date: 2017  : 1995   YES Patient  Verified  Visit #:   15   of   12(+6)  Insurance: Payor: Ann Del Rio / Plan: Renetta Perez PPO / Product Type: PPO /      In time: 255 Out time: 400   Total Treatment Time: 65     Medicare Time Tracking (below)   Total Timed Codes (min):  na 1:1 Treatment Time:  na     TREATMENT AREA =  Right knee pain [M25.561]    SUBJECTIVE  Pain Level (on 0 to 10 scale):  2  / 10   Medication Changes/New allergies or changes in medical history, any new surgeries or procedures? NO    If yes, update Summary List   Subjective Functional Status/Changes:  []  No changes reported     Patient reports his knee feels better compared to his last session. Patient denies complications or increased pain after his last visit. OBJECTIVE  Modalities Rationale:     decrease inflammation and decrease pain to improve patient's ability to perform walking activities.     min [] Estim, type/location:                                      []  att     []  unatt     []  w/US     []  w/ice    []  w/heat    min []  Mechanical Traction: type/lbs                   []  pro   []  sup   []  int   []  cont    []  before manual    []  after manual    min []  Ultrasound, settings/location:      min []  Iontophoresis w/ dexamethasone, location:                                               []  take home patch       []  in clinic   10 min [x]  Ice     []  Heat    location/position: To R knee in supine with bolster    min []  Vasopneumatic Device, press/temp:     min []  Other:    [x] Skin assessment post-treatment (if applicable):    [x]  intact    []  redness- no adverse reaction     []redness  adverse reaction:        40 min Therapeutic Exercise:  [x]  See flow sheet   Rationale:      increase ROM, increase strength, improve coordination, improve balance and increase proprioception to improve the patients ability to perform recreational activities. 15 min Therapeutic Activity: Sled push 165lbs 6x5 yards, 3 x10 yards; short shuttle 4 reps,  3 point stance to stand drill   Rationale:    increase strength, improve coordination, improve balance and increase proprioception to improve the patients ability to perform football activities. min Patient Education:  YES  Reviewed HEP   []  Progressed/Changed HEP based on: Other Objective/Functional Measures:    Patient completed short shuttle in 5.22 seconds as his best time-no pain or instability reported with testing  Held on high pull and RDL today to focus on football drills  Demonstrates good technique with DL and SL box jumps-good landing mechanics with no signs of instability     Post Treatment Pain Level (on 0 to 10) scale:   1  / 10     ASSESSMENT  Assessment/Changes in Function:     Patient progressing well with program. Appropriate to transition toward DC to HEP by the end of next week as patient will be attending football camp      []  See Progress Note/Recertification   Patient will continue to benefit from skilled PT services to modify and progress therapeutic interventions, address functional mobility deficits, address ROM deficits, address strength deficits, analyze and address soft tissue restrictions, analyze and cue movement patterns, analyze and modify body mechanics/ergonomics, assess and modify postural abnormalities and address imbalance/dizziness to attain remaining goals.    Progress toward goals / Updated goals:    Met new LTG#1     PLAN  [x]  Upgrade activities as tolerated YES Continue plan of care   []  Discharge due to :    []  Other:      Therapist: Marnie Storey, PT    Date: 7/20/2017 Time: 5:01 PM     Future Appointments  Date Time Provider Brisa Saxena   7/26/2017 2:30 PM Marnie Storey, PT [facility]

## 2017-07-26 ENCOUNTER — HOSPITAL ENCOUNTER (OUTPATIENT)
Dept: PHYSICAL THERAPY | Age: 22
Discharge: HOME | End: 2017-07-26
Payer: COMMERCIAL

## 2017-07-26 PROCEDURE — 97530 THERAPEUTIC ACTIVITIES: CPT

## 2017-07-26 PROCEDURE — 97110 THERAPEUTIC EXERCISES: CPT

## 2017-07-26 NOTE — PROGRESS NOTES
PHYSICAL THERAPY - DAILY TREATMENT NOTE    Patient Name: Ellie Padilla        Date: 2017  : 1995   YES Patient  Verified  Visit #:   14   of   12(+6)  Insurance: Payor: [employer] [place] / Plan: [employer] [place] PPO / Product Type: PPO /      In time: 225 Out time: 345   Total Treatment Time: 80     Medicare Time Tracking (below)   Total Timed Codes (min):  na 1:1 Treatment Time:  na     TREATMENT AREA =  Right knee pain [M25.561]    SUBJECTIVE  Pain Level (on 0 to 10 scale):  2  / 10   Medication Changes/New allergies or changes in medical history, any new surgeries or procedures? NO    If yes, update Summary List   Subjective Functional Status/Changes:  []  No changes reported     Patient denies complications since his LV. Patient states his knee felt good after his last appointment. OBJECTIVE  Modalities Rationale:     decrease inflammation, decrease pain and increase tissue extensibility to improve patient's ability to perform standing activities.     min [] Estim, type/location:                                      []  att     []  unatt     []  w/US     []  w/ice    []  w/heat    min []  Mechanical Traction: type/lbs                   []  pro   []  sup   []  int   []  cont    []  before manual    []  after manual    min []  Ultrasound, settings/location:      min []  Iontophoresis w/ dexamethasone, location:                                               []  take home patch       []  in clinic   10 min [x]  Ice     []  Heat    location/position: To R knee in long sit    min []  Vasopneumatic Device, press/temp:     min []  Other:    [x] Skin assessment post-treatment (if applicable):    [x]  intact    []  redness- no adverse reaction     []redness  adverse reaction:        55 min Therapeutic Exercise:  [x]  See flow sheet   Rationale:      increase ROM, increase strength, improve coordination, improve balance and increase proprioception to improve the patients ability to perform football activities. 15 min Therapeutic Activity: Short shuttle 3x, sled push 6x5yds, [id number]   Rationale:    increase ROM, increase strength, improve coordination, improve balance and increase proprioception to improve the patients ability to perform athletic activities. min Patient Education:  YES  Reviewed HEP   []  Progressed/Changed HEP based on: Other Objective/Functional Measures:    Patient able to perform short shuttle in 5.02 seconds  Progressed DL box jump to 24 inches  Reported increased fatigue after sled pushes, asked to excuse himself in bathroom for 5 minutes but was after to complete the rest of his program.      Post Treatment Pain Level (on 0 to 10) scale:   1  / 10     ASSESSMENT  Assessment/Changes in Function:     Patient is progressing well toward discharge, plan to re-assess LTG's at [place]. Patient appropriate to DC to HEP after the next 2 visits. []  See Progress Note/Recertification   Patient will continue to benefit from skilled PT services to modify and progress therapeutic interventions, address functional mobility deficits, address ROM deficits, address strength deficits, analyze and address soft tissue restrictions, analyze and cue movement patterns, analyze and modify body mechanics/ergonomics and assess and modify postural abnormalities to attain remaining goals.    Progress toward goals / Updated goals:  Assess triple hop and SL LP 1 rep max at [place]       PLAN  [x]  Upgrade activities as tolerated YES Continue plan of care   []  Discharge due to :    []  Other:      Therapist: Jesus Stephen PT    Date: 7/26/2017 Time: 4:48 PM     Future Appointments  Date Time Provider Brisa Saxena   8/1/2017 3:00 PM Jesus Stephen PT [facility]   8/4/2017 10:30 AM Rita Chester PT [facility]

## 2017-08-01 ENCOUNTER — APPOINTMENT (OUTPATIENT)
Dept: PHYSICAL THERAPY | Age: 22
End: 2017-08-01
Payer: COMMERCIAL

## 2017-08-02 ENCOUNTER — HOSPITAL ENCOUNTER (OUTPATIENT)
Dept: PHYSICAL THERAPY | Age: 22
Discharge: HOME | End: 2017-08-02
Payer: COMMERCIAL

## 2017-08-02 PROCEDURE — 97110 THERAPEUTIC EXERCISES: CPT

## 2017-08-02 PROCEDURE — 97530 THERAPEUTIC ACTIVITIES: CPT

## 2017-08-02 PROCEDURE — 97140 MANUAL THERAPY 1/> REGIONS: CPT

## 2017-08-02 NOTE — PROGRESS NOTES
PHYSICAL THERAPY - DAILY TREATMENT NOTE    Patient Name: Sary Pena        Date: 2017  : 1995   YES Patient  Verified  Visit #:   15   of   12(+6)  Insurance: Payor: Kahl[employer] [place] / Plan: [place] PPO / Product Type: PPO /      In time: 155 Out time: 315   Total Treatment Time: 80     Medicare Time Tracking (below)   Total Timed Codes (min):  na 1:1 Treatment Time:  na     TREATMENT AREA =  Right knee pain [M25.561]    SUBJECTIVE  Pain Level (on 0 to 10 scale):  1  / 10   Medication Changes/New allergies or changes in medical history, any new surgeries or procedures? NO    If yes, update Summary List   Subjective Functional Status/Changes:  []  No changes reported     Patient reports his knee is feeling good today. Patient does continues to feel tightness, however has been stretching frequently. OBJECTIVE      50 min Therapeutic Exercise:  [x]  See flow sheet   Rationale:      increase ROM and increase strength to improve the patients ability to perform walking activities. 10 min Manual Therapy: STM to R HS musculature, pin and stretch to R HS; R HS stretch   Rationale:      decrease pain, increase ROM, increase tissue extensibility and decrease trigger points to improve patient's ability to perform standing activities. 20 min Therapeutic Activity: Short shuttle, triple hop, broad jump, sled push   Rationale:    increase strength, improve coordination, improve balance and increase proprioception to improve the patients ability to perform football activities. min Patient Education:  YES  Reviewed HEP   []  Progressed/Changed HEP based on:        Other Objective/Functional Measures:    Patient test results as follows: Triple Hop R 17.5 feet, L 19 feet; short shuttle 5.19 seconds, broad jump 84 inches      Post Treatment Pain Level (on 0 to 10) scale:   0  / 10     ASSESSMENT  Assessment/Changes in Function:     Patient demonstrates good tolerance to football activities and based upon above measurements, is cleared to return to practice. Plan to DC to HEP at [place].      []  See Progress Note/Recertification   Patient will continue to benefit from skilled PT services to modify and progress therapeutic interventions, address functional mobility deficits, address ROM deficits, address strength deficits, analyze and address soft tissue restrictions, analyze and cue movement patterns, analyze and modify body mechanics/ergonomics and assess and modify postural abnormalities to attain remaining goals.    Progress toward goals / Updated goals:    DC to HEP at [address]  [x]  Upgrade activities as tolerated YES Continue plan of care   []  Discharge due to :    []  Other:      Therapist: Raven Dominguez, PT    Date: 8/2/2017 Time: 4:18 PM     Future Appointments  Date Time Provider Brisa Saxena   8/4/2017 10:30 AM Darin Hood PT [facility]

## 2017-08-04 ENCOUNTER — HOSPITAL ENCOUNTER (OUTPATIENT)
Dept: PHYSICAL THERAPY | Age: 22
Discharge: HOME | End: 2017-08-04
Payer: COMMERCIAL

## 2017-08-04 NOTE — PROGRESS NOTES
[facility] Pelaez [facility] PHYSICAL THERAPY  [address] [place] [address], [address] [place] [address], [address] [place] [address] - Phone: [phone]  Fax: [phone] SUMMARY  Patient Name: Angel Jackson : 1995   Treatment/Medical Diagnosis: Right knee pain [M25.561]   Referral Source: [facility], *     Date of Initial Visit: 17 Attended Visits: 16 Missed Visits: 1     SUMMARY OF TREATMENT  Patient has attended 15 follow up visits since his initial evaluation on 17. Patient has received therapeutic exercise, manual therapy and modalities in order to improve R knee range of motion, mobility, flexibility, strength, stability and pain reduction. CURRENT STATUS  Patient has progressed well with his PT program to date with latest programming designed to improve R knee strength, power and stability. Patient has demonstrated ability to perform SL box jumps to 12\" box with good technique and no increase in pain. Patient's triple hop on the R 92% of the L (17.5 feet on the R, 19 feet on the L) and patient was able to perform an 84 inch broad jump. Additionally, patient demonstrated ability to perform the short shuttle in 5.09 seconds. At this time, patient is appropriate to return to his normal football activities and will be discharged from PT at this time. Thank you for this referral.     Goal/Measure of Progress Goal Met? 1. Increase FOTO score to 74 % show functional improvement   Status at last Eval: 68/100 Current Status: 68/100 no   2. Pt will begin SL plyometric exercise in preparation for return to sport   Status at last Eval: n/a Current Status: See above yes   RECOMMENDATIONS  Discontinue therapy. Progressing towards or have reached established goals. If you have any questions/comments please contact us directly at [phone]. Thank you for allowing us to assist in the care of your patient. Therapist Signature:  Jesus Stephen, PT Date: 17 Time: 8:10 AM